# Patient Record
Sex: MALE | Employment: UNEMPLOYED | URBAN - METROPOLITAN AREA
[De-identification: names, ages, dates, MRNs, and addresses within clinical notes are randomized per-mention and may not be internally consistent; named-entity substitution may affect disease eponyms.]

---

## 2019-11-04 ENCOUNTER — OFFICE VISIT (OUTPATIENT)
Dept: PULMONOLOGY | Age: 59
End: 2019-11-04

## 2019-11-04 VITALS
SYSTOLIC BLOOD PRESSURE: 154 MMHG | BODY MASS INDEX: 32.85 KG/M2 | RESPIRATION RATE: 18 BRPM | HEART RATE: 96 BPM | WEIGHT: 256 LBS | DIASTOLIC BLOOD PRESSURE: 86 MMHG | HEIGHT: 74 IN | TEMPERATURE: 97.8 F | OXYGEN SATURATION: 99 %

## 2019-11-04 DIAGNOSIS — R51.9 NONINTRACTABLE HEADACHE, UNSPECIFIED CHRONICITY PATTERN, UNSPECIFIED HEADACHE TYPE: ICD-10-CM

## 2019-11-04 DIAGNOSIS — R06.81 WITNESSED EPISODE OF APNEA: Primary | ICD-10-CM

## 2019-11-04 DIAGNOSIS — E66.9 OBESITY (BMI 30-39.9): ICD-10-CM

## 2019-11-04 DIAGNOSIS — I10 ESSENTIAL HYPERTENSION: ICD-10-CM

## 2019-11-04 DIAGNOSIS — Z86.73 HISTORY OF TIA (TRANSIENT ISCHEMIC ATTACK): ICD-10-CM

## 2019-11-04 DIAGNOSIS — G47.00 INSOMNIA, UNSPECIFIED TYPE: ICD-10-CM

## 2019-11-04 RX ORDER — ATORVASTATIN CALCIUM 40 MG/1
TABLET, FILM COATED ORAL
COMMUNITY
Start: 2019-09-30

## 2019-11-04 RX ORDER — AMLODIPINE BESYLATE 5 MG/1
TABLET ORAL
COMMUNITY
Start: 2019-09-30

## 2019-11-04 RX ORDER — POTASSIUM CITRATE 5 MEQ/1
540 TABLET, EXTENDED RELEASE ORAL
COMMUNITY
Start: 2019-03-14

## 2019-11-04 NOTE — PROGRESS NOTES
[facility]  Pulmonary, Critical Care, and Sleep Medicine    Office Progress Note- Initial Evaluation      Primary Care Physician: Won Hays NP     Reason for Visit:  Evaluation for report of sleep disorder    Assessment:  1. Witnessed apneas while sleeping- by wife and hospital staff during recent admission. Patient has symptoms and exam findings highly suggestive of a sleep breathing disorder, such as THANIA. Given severity of symptoms and comorbidities additional sleep testing is indicated. 2. Insomnia- life long, does not need much sleep. He reports frequent arousals- unclear if this is related to being a light sleeping or arousals due to a sleep breathing disorder. 3. H/O Hypertension- not on BP medications, mild concentric LVH noted on recent Echo, Echo borderline for pulmonary hypertension  4. Headaches- pending Neurological evaluation- no recent headaches. 5. Bruxism- wears oral guard  6. H/O TIA: 9/11/10- evaluated at [facility]. MRI negative for stroke. Hypertensive at the time  7. H/O [place] Palsy  8. H/O Kidney Stones       Plan:  ·   · Schedule patient for home sleep study for further evaluation. · Potential consequences of untreated sleep apnea, and/or excessive daytime sleepiness were discussed with the patient. · Educational materials provided. · Treatment options including CPAP, dental appliance, weight reduction measures, positional therapy, surgeries etc were discussed. · Healthy lifestyle changes to include weight loss and exercise discussed. · Healthy sleep habits were reviewed and encouraged. ·  and workplace safety reviewed and discussed as appropriate. Drowsy and/or inattentive driving should be avoided. · Follow-up in after sleep testing, sooner should new symptoms or problems arise. History of Present Illness: Mr. Thiago Ballard is a 61 y.o. male patient who presents with report of difficulty sleeping.  The history was provided by the patient. The patient was referred by his PCP from [facility]. He was recently admitted to [facility] for workup of TIA in the setting of hypertension. Occupation:   Current : [employer], Retired [employer], prior Active Duty: Aviation tech, weapons specialst                      Work Schedule: 1926-2641: Tuesday- Friday  Shift work: No    Driving:  yes  Drowsy Driving: is not reported. Motor vehicle accident(s) associated with drowsy driving have not occurred. Snoring:  No reports of snoring. Witnessed apneas are reported by wife. Patient also reports that he was admitted for 3 days at [facility]- for TIA workup. He reports that he was told that he was having apneas at the hospital and that he would breath shallow and his BP would also rise. Wife will often wake him up when he holds his breath and encourages him to breath    Fatigue:   He does not report feeling tired but reports he has always been an \"on the go\" person his entire life and is use to functioning without sleep. He states that he even underwent sleep deprivation testing several years ago while in the [employer] and he was found to be quite functional at that time after going without sleep for 30+ hours. Dental: Teeth clenching or grinding is reported. He is currently wearing a mouth guard. Naps: are not reported. Leg Symptoms/Pain: He does not have unpleasant or crawling sensation in legs or strong urge to move when inactive. He does have some occasional right knee and ankle pain and swelling. GERD: is not reported. - Watches diet, was an issue about 6 months ago. Mood: Happy, denies depression. Sleep-Wake History:       Estimates sleeping approximately 5 hours per night/day during work week and 8 hours on the weekend. Reports sleeping in a Bed with 1 pillows. He gets into bed at approximately 4300-6205. He takes Z-quil until 2000. Once in bed,he will fall asleep.   It usually takes up to 30 minutes to fall asleep after going to bed. He does not feel that he is getting deep sleep. He wakes up every 2 hours. Reports waking up to use the bathroom 0-1 times nightly. Pain, typically does disturb their sleep but not typical.    Vivid dreams are not reported. He normally awakens without an alarm to start their day at 0445. He  typically gets out of bed at that time. He gets up performs pushups, irons, etc.     Waking up with a morning headache is not reported. Awakening with a dry mouth is reported. Symptom(s) suggestive of cataplexy are not reported. Sleep paralysis is not  reported. Hypnagogic and/or hypnopompic hallucinations are not reported. Sleep walking is not  currently reported. He did sleep walk before the age of 15 and did complex acts. Sleep talking are not reported. Other unusual and/or parasomnia behaviors are not reported. Family Sleep History:  · None Known        Stop Lemtad Merl 11/4/2019   Does the patient snore loudly (louder than talking or loud enough to be heard through closed doors)? 0   Does the patient often feel tired, fatigued, or sleepy during the daytime, even after a \"good\" night's sleep? 0   Has anyone ever observed the patient stop breathing during their sleep?  0   Does the patient have or are they being treated for high blood pressure? 0   Is the patient's BMI greater than 35? 0   Is your neck circumference greater than 17 inches (Male) or 16 inches (Female)? 1   Is the patient older than 48? 1   Is the patient male?  1   THANIA Score 3       3 most recent PHQ Screens 11/4/2019   Little interest or pleasure in doing things Not at all   Feeling down, depressed, irritable, or hopeless Not at all   Total Score PHQ 2 0       [place] Scale 11/4/2019   Sitting and Reading 0   Watching TV 0   Sitting, inactive in a public place (e.g. a movie theater or meeting) 0   As a passenger in a car for an hour, without a break 3   Lying down to rest in the afternoon, when circumstances permit 0   Sitting and talking to someone 0   Sitting quietly after lunch without alcohol 0   In a car, while stopped for a few minutes in traffic 0   [place] Sleepiness Score 3        Neck circ. in \"inches\": 18         Past Medical History:  History reviewed. No pertinent past medical history. Past Surgical History:  History reviewed. No pertinent surgical history. Family History:  No family history on file. Social History:  Social History     Tobacco Use    Smoking status: Never Smoker    Smokeless tobacco: Never Used   Substance Use Topics    Alcohol use: Not on file    Drug use: Not on file        Caffeine Amount Time of last Intake Comments   Coffee 1 C/am: 12oz. Soda None     Tea Rare  Home made- no sugar,   Energy Drinks None     Over- the - counter stimulant pills None     Other Substances      Alcohol 3-4G/week  wine   Tobacco Never     Drugs None     Other: None         Medications:  Current Outpatient Medications on File Prior to Visit   Medication Sig Dispense Refill    amLODIPine (NORVASC) 5 mg tablet       atorvastatin (LIPITOR) 40 mg tablet       potassium citrate (UROCIT-K5) 5 mEq (540 mg) TbER tablet Take 540 mg by mouth. No current facility-administered medications on file prior to visit.          Allergy:  No Known Allergies    Review of Systems  General ROS: positive for  - sleep disturbance  negative for - chills, fever, hot flashes, malaise or night sweats  ENT ROS: negative for - epistaxis, nasal congestion, nasal discharge, nasal polyps, oral lesions, sinus pain, sneezing, sore throat, tinnitus, vertigo, visual changes or vocal changes- he does have some allergic rhinitis and takes PRN Allegra - not a current issue  Hematological and Lymphatic ROS: negative for - bleeding problems, blood clots, bruising, jaundice, pallor or swollen lymph nodes  Endocrine ROS: negative for - polydipsia/polyuria, skin changes, temperature intolerance or unexpected weight changes  Respiratory ROS: no cough, shortness of breath, or wheezing  Cardiovascular ROS: no chest pain or dyspnea on exertion  Gastrointestinal ROS: no abdominal pain, change in bowel habits, or black or bloody stools  Genito-Urinary ROS: no dysuria, trouble voiding, or hematuria  Musculoskeletal ROS: as above  Neurological ROS: no TIA or stroke symptoms  Dermatological ROS: negative for - pruritus, rash or skin lesion changes   Psychological ROS: negative   Otherwise negative and per HPI      Physical Exam:  Blood pressure 154/86, pulse 96, temperature 97.8 °F (36.6 °C), temperature source Oral, resp. rate 18, height 6' 2\" (1.88 m), weight 116.1 kg (256 lb), SpO2 99 %. on RA, Body mass index is 32.87 kg/m². General: No distress, acyanotic, appears stated age, cooperative, pleasant  HEENT: PERRL, EOMI, throat without erythema or exudate, Tongue- dental indention on tongue, Mallampati's score 2+, Uvula- midline, Pharyngeal arch is more posterior, Tonsils- not seen, minimal nasal congestion  Neck: Supple,  no abnormally enlarged lymph nodes, thyroid is not enlarged, non-tender, no JVD, No carotid bruits  Chest: Normal.  Lungs: Moderate air entry, clear to auscultation bilaterally,   Heart: Regular rate and rhythm, S1S2 present, without murmur. Abdomen: Protuberant, bowel sounds normoactive, abdomen is soft without significant tenderness, or guarding. Extremity: Negative for cyanosis, edema, or clubbing. Skin: Skin color, texture, turgor normal. No rashes or lesions. Neurological: CN 2-12 grossly intact, normal muscle tone.     Data Reviewed:  CBC: No results found for: WBC, WBCLT, HGBPOC, HGB, HGBP, HCTPOC, HCT, PHCT, RBCH, PLT, MCV, HGBEXT, HCTEXT, PLTEXT, HGBEXT, HCTEXT, PLTEXT   [facility]      BMP: No results found for: NA, K, CL, CO2, AGAP, GLU, BUN, CREA, BUCR, GFRAA, GFRNA, CA, GFRAA     TSH:  No results found for: TSH, TSH2, TSH3, TSHP, TSHELE, TSHEXT, TSHEXT    Imaging:  [x]I have personally reviewed the patients radiographs section   No results found for this or any previous visit. CXR: [facility]: 9/8/19- films not available for review. Reported as normal    No results found for this or any previous visit. Cardiac Echo:      ECHOCARDIOGRAM [address]  Component Name Value Ref Range   EF Echo 55     Result Impression   :   NORMAL LEFT VENTRICULAR CAVITY SIZE. MILD CONCENTRIC LEFT VENTRICULAR HYPERTROPHY. NORMAL GLOBAL LEFT VENTRICULAR SYSTOLIC FUNCTION. EJECTION FRACTION IS 55%. NO WALL MOTION ABNORMALITIES. MILD DIASTOLIC DYSFUNCTION. NORMAL CHAMBER SIZES. NO HEMODYNAMICALLY SIGNIFICANT VALVULAR PATHOLOGY. NORMAL PULMONARY ARTERY PRESSURE OF  26 MMHG. NEGATIVE BUBBLE STUDY. NO PREVIOUS REPORT FOR COMPARISON. Historical Sleep Testing Data: No Testing To Date.           Elex Raman, DO, FCCP  Pulmonary, Sleep and Critical Care Medicine

## 2019-11-04 NOTE — PROGRESS NOTES
Aimee Parks presents today for   Chief Complaint   Patient presents with    Sleep Problem       Is someone accompanying this pt? No    Is the patient using any DME equipment during OV? No    -DME Company None    Depression Screening:  3 most recent PHQ Screens 11/4/2019   Little interest or pleasure in doing things Not at all   Feeling down, depressed, irritable, or hopeless Not at all   Total Score PHQ 2 0       Learning Assessment:  Learning Assessment 11/4/2019   PRIMARY LEARNER Patient   PRIMARY LANGUAGE ENGLISH   LEARNER PREFERENCE PRIMARY DEMONSTRATION   ANSWERED BY Patient   RELATIONSHIP SELF       Abuse Screening:  No flowsheet data found. Fall Risk  No flowsheet data found. Coordination of Care:  1. Have you been to the ER, urgent care clinic since your last visit? [facility] since your last visit? [facility]    2. Have you seen or consulted any other health care providers outside of the [address] [place] [address] since your last visit? Include any pap smears or colon screening.  Butch Castellanos

## 2019-11-04 NOTE — LETTER
11/4/19 Patient: Mary Cavazos YOB: 1960 Date of Visit: 11/4/2019 Cecil Galindo NP 
[address] [address] VIA Facsimile: [phone] Dear Cecil Galindo NP, Thank you for referring Mr. Mary Cavazos to [address] [place] [address] for evaluation. My notes for this consultation are attached. If you have questions, please do not hesitate to call me. I look forward to following your patient along with you.  
 
 
Sincerely, 
 
Linda Garner, DO

## 2019-12-16 ENCOUNTER — HOSPITAL ENCOUNTER (OUTPATIENT)
Dept: SLEEP MEDICINE | Age: 59
Discharge: HOME | End: 2019-12-16
Payer: OTHER GOVERNMENT

## 2019-12-16 DIAGNOSIS — Z86.73 HISTORY OF TIA (TRANSIENT ISCHEMIC ATTACK): ICD-10-CM

## 2019-12-16 DIAGNOSIS — R06.81 WITNESSED EPISODE OF APNEA: ICD-10-CM

## 2019-12-16 DIAGNOSIS — G47.00 INSOMNIA, UNSPECIFIED TYPE: ICD-10-CM

## 2019-12-16 DIAGNOSIS — I10 ESSENTIAL HYPERTENSION: ICD-10-CM

## 2019-12-16 DIAGNOSIS — R51.9 NONINTRACTABLE HEADACHE, UNSPECIFIED CHRONICITY PATTERN, UNSPECIFIED HEADACHE TYPE: ICD-10-CM

## 2019-12-16 DIAGNOSIS — E66.9 OBESITY (BMI 30-39.9): ICD-10-CM

## 2019-12-16 PROCEDURE — 95806 SLEEP STUDY UNATT&RESP EFFT: CPT

## 2019-12-17 ENCOUNTER — HOSPITAL ENCOUNTER (OUTPATIENT)
Dept: SLEEP MEDICINE | Age: 59
Discharge: HOME | End: 2019-12-17
Payer: OTHER GOVERNMENT

## 2019-12-22 DIAGNOSIS — G47.33 OSA (OBSTRUCTIVE SLEEP APNEA): Primary | ICD-10-CM

## 2019-12-23 NOTE — PROGRESS NOTES
The patient underwent sleep testing on 12/16/19. Recommendations listed below. Please refer to full report for specific details. Interpretation   Mild Obstructive Sleep Apnea (THANIA), AHI: 8.4.  The Oxygen Desaturation Index (OSI) is 9.1 and the SpO2 nu for this study is 78%.  The heart rate range between 56-89 bpm with an average of 71 bpm.   Start APAP 5/15/cwp.  Other non-invasive treatment options are recommended were applicable and include  the following: weight reduction, smoking cessation, body position training, and modification of  alcohol ingestion and/or sedating agents.  Healthy sleep habit education and reinforcement will be reviewed with the patient.  Individuals are encouraged to obtain 7-9 hours of sleep per day.   safety is encouraged. Drowsy and/or inattentive driving should be avoided.  Follow up with referring provider.     Aditi Roach DO, [facility]    [facility]  Pulmonary, Critical Care, and Sleep Medicine

## 2020-01-22 ENCOUNTER — TELEPHONE (OUTPATIENT)
Dept: PULMONOLOGY | Age: 60
End: 2020-01-22

## 2020-03-04 ENCOUNTER — TELEPHONE (OUTPATIENT)
Dept: PULMONOLOGY | Age: 60
End: 2020-03-04